# Patient Record
Sex: FEMALE | Race: WHITE
[De-identification: names, ages, dates, MRNs, and addresses within clinical notes are randomized per-mention and may not be internally consistent; named-entity substitution may affect disease eponyms.]

---

## 2017-08-27 ENCOUNTER — RX RENEWAL (OUTPATIENT)
Age: 22
End: 2017-08-27

## 2017-12-15 ENCOUNTER — RX RENEWAL (OUTPATIENT)
Age: 22
End: 2017-12-15

## 2018-03-06 ENCOUNTER — RX RENEWAL (OUTPATIENT)
Age: 23
End: 2018-03-06

## 2018-03-26 ENCOUNTER — APPOINTMENT (OUTPATIENT)
Dept: INTERNAL MEDICINE | Facility: CLINIC | Age: 23
End: 2018-03-26

## 2018-05-25 ENCOUNTER — RX RENEWAL (OUTPATIENT)
Age: 23
End: 2018-05-25

## 2018-06-01 ENCOUNTER — CLINICAL ADVICE (OUTPATIENT)
Age: 23
End: 2018-06-01

## 2018-06-01 DIAGNOSIS — Z30.41 ENCOUNTER FOR SURVEILLANCE OF CONTRACEPTIVE PILLS: ICD-10-CM

## 2018-06-08 ENCOUNTER — LABORATORY RESULT (OUTPATIENT)
Age: 23
End: 2018-06-08

## 2018-06-08 ENCOUNTER — APPOINTMENT (OUTPATIENT)
Dept: INTERNAL MEDICINE | Facility: CLINIC | Age: 23
End: 2018-06-08
Payer: COMMERCIAL

## 2018-06-08 VITALS
TEMPERATURE: 99.2 F | WEIGHT: 148 LBS | HEART RATE: 100 BPM | DIASTOLIC BLOOD PRESSURE: 70 MMHG | BODY MASS INDEX: 22.96 KG/M2 | HEIGHT: 67.5 IN | SYSTOLIC BLOOD PRESSURE: 100 MMHG | OXYGEN SATURATION: 99 %

## 2018-06-08 DIAGNOSIS — Z86.2 PERSONAL HISTORY OF DISEASES OF THE BLOOD AND BLOOD-FORMING ORGANS AND CERTAIN DISORDERS INVOLVING THE IMMUNE MECHANISM: ICD-10-CM

## 2018-06-08 DIAGNOSIS — Z00.00 ENCOUNTER FOR GENERAL ADULT MEDICAL EXAMINATION W/OUT ABNORMAL FINDINGS: ICD-10-CM

## 2018-06-08 PROCEDURE — 81003 URINALYSIS AUTO W/O SCOPE: CPT | Mod: QW

## 2018-06-08 PROCEDURE — 99395 PREV VISIT EST AGE 18-39: CPT | Mod: 25

## 2018-06-08 NOTE — HISTORY OF PRESENT ILLNESS
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 no relationships. just had gyn visit and std testing. had chlamydia earlier this year, treated. will consider std testing again since did not get result yet from gyn.\par \par eye exam utd \par \par due for tdap, men b. utd gardasil. \par \par does not exercise. \par \par had rash this winter thought ?hsp. given medicine and resolved has not returned. [FreeTextEntry1] : see note dated today 6 8 18

## 2018-06-08 NOTE — ASSESSMENT
[FreeTextEntry1] : Rec std testing; gave pt slip and she will decide based on results from gyn.\par discussed iud vs ocp. emphasized neither offers std protection\par due for tdap but we are out\par due for menb she will decide. \par discussed exercise\par given hx rash ?hsp check ua, see eye doctor\par

## 2018-06-08 NOTE — REVIEW OF SYSTEMS
[Fever] : no fever [Night Sweats] : no night sweats [Vision Problems] : no vision problems [Earache] : no earache [Chest Pain] : no chest pain [Lower Ext Edema] : no lower extremity edema [Shortness Of Breath] : no shortness of breath [Abdominal Pain] : no abdominal pain [Vomiting] : no vomiting [Dysuria] : no dysuria [Joint Pain] : no joint pain [Skin Rash] : no skin rash [Headache] : no headache [Depression] : no depression

## 2018-06-08 NOTE — HISTORY OF PRESENT ILLNESS
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 no relationships. just had gyn visit and std testing. had chlamydia earlier this year, treated. will consider std testing again since did not get result yet from gyn.\par \par eye exam utd \par \par due for tdap, men b. utd gardasil. \par \par does not exercise. \par \par had rash this winter thought ?hsp. given medicine and resolved has not returned. [FreeTextEntry1] : see note dated today 6 8 18

## 2018-06-11 LAB
APPEARANCE: CLEAR
BILIRUBIN URINE: NEGATIVE
BLOOD URINE: ABNORMAL
COLOR: YELLOW
GLUCOSE QUALITATIVE U: NEGATIVE MG/DL
KETONES URINE: NEGATIVE
LEUKOCYTE ESTERASE URINE: NEGATIVE
NITRITE URINE: NEGATIVE
PH URINE: 6
PROTEIN URINE: NEGATIVE MG/DL
SPECIFIC GRAVITY URINE: 1.02
UROBILINOGEN URINE: NEGATIVE MG/DL

## 2019-04-22 ENCOUNTER — MOBILE ON CALL (OUTPATIENT)
Age: 24
End: 2019-04-22

## 2019-04-23 ENCOUNTER — MESSAGE (OUTPATIENT)
Age: 24
End: 2019-04-23